# Patient Record
Sex: FEMALE | Race: BLACK OR AFRICAN AMERICAN | HISPANIC OR LATINO | Employment: UNEMPLOYED | URBAN - METROPOLITAN AREA
[De-identification: names, ages, dates, MRNs, and addresses within clinical notes are randomized per-mention and may not be internally consistent; named-entity substitution may affect disease eponyms.]

---

## 2023-04-12 ENCOUNTER — OFFICE VISIT (OUTPATIENT)
Dept: OBSTETRICS AND GYNECOLOGY | Facility: CLINIC | Age: 33
End: 2023-04-12
Payer: COMMERCIAL

## 2023-04-12 VITALS
BODY MASS INDEX: 23.63 KG/M2 | SYSTOLIC BLOOD PRESSURE: 110 MMHG | DIASTOLIC BLOOD PRESSURE: 74 MMHG | WEIGHT: 141.8 LBS | HEIGHT: 65 IN

## 2023-04-12 DIAGNOSIS — D25.9 UTERINE LEIOMYOMA, UNSPECIFIED LOCATION: ICD-10-CM

## 2023-04-12 DIAGNOSIS — Z01.411 ENCOUNTER FOR GYNECOLOGICAL EXAMINATION WITH ABNORMAL FINDING: Primary | ICD-10-CM

## 2023-04-12 DIAGNOSIS — N97.9 FEMALE FERTILITY PROBLEM: ICD-10-CM

## 2023-04-12 NOTE — PROGRESS NOTES
GYN Annual Exam     CC- Here for annual exam.  Video  used.    Lashae Meyers is a 33 y.o. female who presents for annual well woman exam. Periods are regular every 28-30 days, lasting 4 days. Dysmenorrhea:mild, occurring throughout menses. Cyclic symptoms include none. No intermenstrual bleeding, spotting, or discharge.    She is from [place] originally and had been diagnosed with small uterine fibroid about 2 and half years ago.  She has developed increasing intermittent sharp abdominal pain below the umbilicus.  There is no real association with any specific activity.  She has moderate bleeding for 3 of her days of menses.  She has been off birth control pills for the last 3 years and has never conceived in that timeframe.  She has 1 pregnancy at age 19 which was electively terminated.  She apparently had an appendicitis for which she was hospitalized for 1 week and had appendectomy when she was 16 years old.    OB History        1    Para        Term                AB   1    Living           SAB        IAB        Ectopic        Molar        Multiple        Live Births                    Current contraception: none  History of abnormal Pap smear: no  Family history of uterine, colon or ovarian cancer: no  History of abnormal mammogram: no  Family history of breast cancer: no  Last Pap : Several years    Past Medical History:   Diagnosis Date   • History of uterine fibroid        Past Surgical History:   Procedure Laterality Date   • APPENDECTOMY         No current outpatient medications on file.    No Known Allergies    Social History     Tobacco Use   • Smoking status: Never   • Smokeless tobacco: Never   Vaping Use   • Vaping Use: Never used   Substance Use Topics   • Alcohol use: Never   • Drug use: Never       History reviewed. No pertinent family history.    Review of Systems   Constitutional: Negative for fatigue and fever.   Gastrointestinal: Positive for abdominal pain.  "  Genitourinary: Negative for menstrual problem, urgency and urinary incontinence.       /74   Ht 165 cm (64.96\")   Wt 64.3 kg (141 lb 12.8 oz)   LMP 03/26/2023   BMI 23.63 kg/m²     Physical Exam  Constitutional:       Appearance: She is normal weight.   Genitourinary:      Bladder and urethral meatus normal.      No lesions in the vagina.      Right Labia: No lesions.     Left Labia: No lesions.     No vaginal discharge or bleeding.      No vaginal prolapse present.     No vaginal atrophy present.       Right Adnexa: not tender, not full and no mass present.     Left Adnexa: not tender, not full and no mass present.     No cervical motion tenderness, discharge or lesion.      Uterus is enlarged.      Uterus is not fixed or tender.      No uterine mass detected.     Uterus is midaxial.   Breasts:     Right: No mass, nipple discharge, skin change or tenderness.      Left: No mass, nipple discharge, skin change or tenderness.   Neck:      Thyroid: No thyroid mass or thyromegaly.   Abdominal:      General: Abdomen is flat. A surgical scar is present.      Palpations: Abdomen is soft. There is no mass.      Tenderness: There is no abdominal tenderness.       Neurological:      Mental Status: She is alert.   Vitals reviewed.               Assessment     1) GYN annual well woman exam.   2) history of uterine fibroid.  Will obtain pelvic ultrasound and discuss findings.  Uterus is minimally enlarged on my exam.  3) infertility.  3 years of concerted effort for pregnancy and no pregnancy.  Possibility of tubal factor discussed with patient given her history of appendicitis and hospitalization for 1 week at that time.  We discussed possibly pursuing laparoscopy and I gave her printed information regarding that.     Plan     1) Breast Health - Clinical breast exam yearly, Discussed American cancer society recommendations for breast cancer screening, and Self breast awareness monthly  2) Pap -done today with " high-risk HPV  3) Smoking status-negative  4) Encouraged to be wary of information obtained via social media and internet based on source and search.  5) Follow up prn and one year.       Marco Antonio Iverson MD   4/12/2023  10:26 EDT

## 2023-04-18 LAB
CYTOLOGIST CVX/VAG CYTO: NORMAL
CYTOLOGY CVX/VAG DOC CYTO: NORMAL
CYTOLOGY CVX/VAG DOC THIN PREP: NORMAL
DX ICD CODE: NORMAL
HIV 1 & 2 AB SER-IMP: NORMAL
HPV GENOTYPE REFLEX: NORMAL
HPV I/H RISK 4 DNA CVX QL PROBE+SIG AMP: NEGATIVE
Lab: NORMAL
OTHER STN SPEC: NORMAL
STAT OF ADQ CVX/VAG CYTO-IMP: NORMAL

## 2023-04-26 ENCOUNTER — OFFICE VISIT (OUTPATIENT)
Dept: OBSTETRICS AND GYNECOLOGY | Facility: CLINIC | Age: 33
End: 2023-04-26
Payer: COMMERCIAL

## 2023-04-26 VITALS
BODY MASS INDEX: 23.49 KG/M2 | WEIGHT: 141 LBS | DIASTOLIC BLOOD PRESSURE: 70 MMHG | SYSTOLIC BLOOD PRESSURE: 118 MMHG | HEIGHT: 65 IN

## 2023-04-26 DIAGNOSIS — N97.9 FEMALE FERTILITY PROBLEM: Primary | ICD-10-CM

## 2023-04-26 DIAGNOSIS — D25.9 UTERINE LEIOMYOMA, UNSPECIFIED LOCATION: ICD-10-CM

## 2023-04-26 DIAGNOSIS — N73.6 PELVIC ADHESIVE DISEASE: ICD-10-CM

## 2023-04-26 RX ORDER — SODIUM CHLORIDE 0.9 % (FLUSH) 0.9 %
10 SYRINGE (ML) INJECTION AS NEEDED
OUTPATIENT
Start: 2023-04-26

## 2023-04-26 RX ORDER — SODIUM CHLORIDE 9 MG/ML
40 INJECTION, SOLUTION INTRAVENOUS AS NEEDED
OUTPATIENT
Start: 2023-04-26

## 2023-04-26 RX ORDER — SODIUM CHLORIDE 0.9 % (FLUSH) 0.9 %
3 SYRINGE (ML) INJECTION EVERY 12 HOURS SCHEDULED
OUTPATIENT
Start: 2023-04-26

## 2023-04-26 NOTE — PROGRESS NOTES
CC: Ultrasound follow-up  Patient is seen following her pelvic ultrasound today.  Shows normal uterus and normal adnexa.  There is 1 small fibroid that is less than 2 cm in diameter seen in the myometrium.  There are no adnexal masses and no free fluid.  The entire visit was carried out with the use of a video .  I went back to our discussion from 2 weeks ago when we discussed the possibility of pelvic adhesive disease relating to her infertility.  I discussed again potential for doing laparoscopy and fallopian tube assessment and possible lysis of adhesions as indicated.  Her main risk factor was a prolonged infection with appendicitis when she was a teenager.  She would like to proceed in this manner.  I already given her written information about this.  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 This time includes time spent by me in the following activities: preparing for the visit, reviewing tests, obtaining and/or reviewing a separately obtained history, counseling and educating the patient/family/caregiver, ordering medications, tests, or procedures, documenting information in the medical record and independently interpreting results and communicating that information with the patient/family/caregiver

## 2023-06-19 PROBLEM — N97.9 FEMALE FERTILITY PROBLEM: Status: ACTIVE | Noted: 2023-06-19

## 2023-06-19 PROBLEM — N73.6 PELVIC ADHESIVE DISEASE: Status: ACTIVE | Noted: 2023-06-19

## 2024-05-20 ENCOUNTER — OFFICE VISIT (OUTPATIENT)
Dept: OBSTETRICS AND GYNECOLOGY | Facility: CLINIC | Age: 34
End: 2024-05-20
Payer: COMMERCIAL

## 2024-05-20 VITALS
SYSTOLIC BLOOD PRESSURE: 102 MMHG | HEIGHT: 65 IN | BODY MASS INDEX: 22.66 KG/M2 | WEIGHT: 136 LBS | DIASTOLIC BLOOD PRESSURE: 68 MMHG

## 2024-05-20 DIAGNOSIS — Z01.411 ENCOUNTER FOR GYNECOLOGICAL EXAMINATION WITH ABNORMAL FINDING: ICD-10-CM

## 2024-05-20 DIAGNOSIS — Z00.00 ANNUAL PHYSICAL EXAM: Primary | ICD-10-CM

## 2024-05-20 DIAGNOSIS — D25.9 UTERINE LEIOMYOMA, UNSPECIFIED LOCATION: ICD-10-CM

## 2024-05-20 NOTE — PROGRESS NOTES
"GYN Annual Exam     CC- Here for annual exam.     Lashae Meyers is a 34 y.o. female who presents for annual well woman exam. Periods are regular every 28-30 days, lasting a few days. Dysmenorrhea:none. Cyclic symptoms include none. No intermenstrual bleeding, spotting, or discharge.    Patient is a new patient and has a history of uterine fibroids    OB History          1    Para        Term                AB   1    Living             SAB        IAB        Ectopic        Molar        Multiple        Live Births                    Current contraception: none  History of abnormal Pap smear: no  Family history of uterine, colon or ovarian cancer: yes - her mother had some form of uterine cancer  History of abnormal mammogram: no  Family history of breast cancer: no  Last Pap : 1 year    Past Medical History:   Diagnosis Date    History of uterine fibroid        Past Surgical History:   Procedure Laterality Date    APPENDECTOMY         No current outpatient medications on file.    No Known Allergies    Social History     Tobacco Use    Smoking status: Never    Smokeless tobacco: Never   Vaping Use    Vaping status: Never Used   Substance Use Topics    Alcohol use: Never    Drug use: Never       Family History   Problem Relation Age of Onset    Uterine cancer Mother     Colon cancer Neg Hx     Breast cancer Neg Hx     Ovarian cancer Neg Hx        Review of Systems   Constitutional:  Negative for fatigue and fever.   Genitourinary:  Negative for menstrual problem and pelvic pain.       /68   Ht 165 cm (64.96\")   Wt 61.7 kg (136 lb)   LMP 2024 (Exact Date)   BMI 22.66 kg/m²     Physical Exam  Constitutional:       Appearance: She is normal weight.   Genitourinary:      Bladder and urethral meatus normal.      No lesions in the vagina.      Right Labia: No lesions.     Left Labia: No lesions.     No vaginal discharge, tenderness or bleeding.      No vaginal prolapse present.     No " vaginal atrophy present.       Right Adnexa: not tender, not full and no mass present.     Left Adnexa: not tender, not full and no mass present.     Cervix is nulliparous.      No cervical motion tenderness, discharge, friability or lesion.      Uterus is enlarged.      Uterus is not fixed or tender.      No uterine mass detected.     Uterus is midaxial.   Breasts:     Right: No mass, nipple discharge, skin change or tenderness.      Left: No mass, nipple discharge, skin change or tenderness.   Neck:      Thyroid: No thyroid mass or thyromegaly.   Abdominal:      General: Abdomen is flat.      Palpations: Abdomen is soft. There is no mass.      Tenderness: There is no abdominal tenderness.   Neurological:      Mental Status: She is alert.   Vitals reviewed.               Assessment     1) GYN annual well woman exam.   2) enlarged fibroid uterus.  Will get updated ultrasound to assess.     Plan     1) Breast Health - Clinical breast exam yearly, Discussed American cancer society recommendations for breast cancer screening, and Self breast awareness monthly  2) Pap -done today  3) Smoking status-negative  4) Encouraged to be wary of information obtained via social media and internet based on source and search.  5) Follow up prn and one year.       Marco Antonio Iverson MD   5/20/2024  13:58 EDT

## 2024-05-24 LAB
CYTOLOGIST CVX/VAG CYTO: NORMAL
CYTOLOGY CVX/VAG DOC CYTO: NORMAL
CYTOLOGY CVX/VAG DOC THIN PREP: NORMAL
DX ICD CODE: NORMAL
HPV I/H RISK 4 DNA CVX QL PROBE+SIG AMP: NEGATIVE
Lab: NORMAL
OTHER STN SPEC: NORMAL
STAT OF ADQ CVX/VAG CYTO-IMP: NORMAL

## 2024-06-21 ENCOUNTER — OFFICE VISIT (OUTPATIENT)
Dept: OBSTETRICS AND GYNECOLOGY | Facility: CLINIC | Age: 34
End: 2024-06-21
Payer: COMMERCIAL

## 2024-06-21 VITALS
SYSTOLIC BLOOD PRESSURE: 104 MMHG | WEIGHT: 138 LBS | BODY MASS INDEX: 22.99 KG/M2 | HEIGHT: 65 IN | DIASTOLIC BLOOD PRESSURE: 70 MMHG | HEART RATE: 78 BPM

## 2024-06-21 DIAGNOSIS — D25.9 UTERINE LEIOMYOMA, UNSPECIFIED LOCATION: Primary | ICD-10-CM

## 2024-06-21 NOTE — PROGRESS NOTES
"        REASON FOR FOLLOWUP/CHIEF COMPLAINT: Discuss US results      HISTORY OF PRESENT ILLNESS: The patient is here today for follow-up of her pelvic ultrasound done today.  The ultrasound shows the following:  Comparison is prior GYN ultrasound in April 2023. Today's study shows normal size uterus measuring 8.8 x 4.9 by 7.8 cm. Prominent endometrium seen measuring 0.88 cm. Right lateral Intramural fibroid seen measuring 3.5 x 3.2 cm. Both ovaries are normal size with small follicular cyst seen and small amount of posterior cul-de-sac free fluid seen. The overall uterine size and the size of the fibroid measures are both increased from previous ultrasound.   I explained the findings with her through the use of an .  She is really not having any significant problems with her cycle and no pain.  We will follow the fibroid with clinical exams and ultrasound as needed.  Past Medical History, Past Surgical History, Social History, Family History have been reviewed and are without significant changes except as mentioned.    Review of Systems   Review of Systems    Medications:  The current medication list was reviewed in the EMR    ALLERGIES:  No Known Allergies           Vitals:    06/21/24 1221   BP: 104/70   Pulse: 78   Weight: 62.6 kg (138 lb)   Height: 165 cm (64.96\")     Physical Exam    CONSTITUTIONAL:  Vital signs reviewed.  No distress, looks comfortable.    PSYCHIATRIC:  Normal judgment and insight.  Normal mood and affect.       RECENT LABS:No results found for: \"WBC\", \"HGB\", \"PLT\"    ASSESSMENT/PLAN:  Lashae Meyers [unfilled]   1.  Uterine fibroid.  No change in management and she will return in 1 year for follow-up.  "

## 2025-08-21 ENCOUNTER — TELEPHONE (OUTPATIENT)
Dept: OBSTETRICS AND GYNECOLOGY | Facility: CLINIC | Age: 35
End: 2025-08-21
Payer: COMMERCIAL